# Patient Record
Sex: FEMALE | URBAN - METROPOLITAN AREA
[De-identification: names, ages, dates, MRNs, and addresses within clinical notes are randomized per-mention and may not be internally consistent; named-entity substitution may affect disease eponyms.]

---

## 2017-05-01 ENCOUNTER — TRANSFERRED RECORDS (OUTPATIENT)
Dept: HEALTH INFORMATION MANAGEMENT | Facility: CLINIC | Age: 77
End: 2017-05-01

## 2017-09-12 ENCOUNTER — CARE COORDINATION (OUTPATIENT)
Dept: GASTROENTEROLOGY | Facility: CLINIC | Age: 77
End: 2017-09-12

## 2017-10-11 ENCOUNTER — CARE COORDINATION (OUTPATIENT)
Dept: GASTROENTEROLOGY | Facility: CLINIC | Age: 77
End: 2017-10-11

## 2017-10-11 NOTE — PROGRESS NOTES
Care Coordination Telephone Call  GI Service and Surgical Oncology    Called patient to discuss whether or not she would like to come for consult with Dr. Miles.  I have left message and will contact Dr. Montiel's office to let them know that unless we hear from the patient we will not make any further f/u calls regarding scheduling appointment.    I have spoken with Renee and Rebecca at Dr. Garcia office and they are aware of this.    I have asked the patient to call with any additional questions or concerns and have provided my contact information.      Alma HAYNESN, HNBC, STAR-T  RN Care Coordinator  Surgical Oncology and GI service  Ph: [phone]  FAX: [phone]